# Patient Record
Sex: MALE | Race: WHITE | NOT HISPANIC OR LATINO | Employment: OTHER | URBAN - METROPOLITAN AREA
[De-identification: names, ages, dates, MRNs, and addresses within clinical notes are randomized per-mention and may not be internally consistent; named-entity substitution may affect disease eponyms.]

---

## 2023-10-31 ENCOUNTER — HOSPITAL ENCOUNTER (EMERGENCY)
Facility: HOSPITAL | Age: 88
Discharge: HOME | End: 2023-10-31
Attending: STUDENT IN AN ORGANIZED HEALTH CARE EDUCATION/TRAINING PROGRAM
Payer: MEDICARE

## 2023-10-31 VITALS
RESPIRATION RATE: 18 BRPM | OXYGEN SATURATION: 98 % | TEMPERATURE: 97.5 F | HEART RATE: 67 BPM | SYSTOLIC BLOOD PRESSURE: 137 MMHG | WEIGHT: 170 LBS | DIASTOLIC BLOOD PRESSURE: 87 MMHG | HEIGHT: 68 IN | BODY MASS INDEX: 25.76 KG/M2

## 2023-10-31 DIAGNOSIS — R35.0 URINARY FREQUENCY: Primary | ICD-10-CM

## 2023-10-31 LAB
ALBUMIN SERPL BCP-MCNC: 4.4 G/DL (ref 3.4–5)
ALP SERPL-CCNC: 90 U/L (ref 33–136)
ALT SERPL W P-5'-P-CCNC: 22 U/L (ref 10–52)
ANION GAP SERPL CALC-SCNC: 12 MMOL/L (ref 10–20)
APPEARANCE UR: CLEAR
AST SERPL W P-5'-P-CCNC: 22 U/L (ref 9–39)
BASOPHILS # BLD AUTO: 0.09 X10*3/UL (ref 0–0.1)
BASOPHILS NFR BLD AUTO: 0.8 %
BILIRUB SERPL-MCNC: 0.4 MG/DL (ref 0–1.2)
BILIRUB UR STRIP.AUTO-MCNC: NEGATIVE MG/DL
BUN SERPL-MCNC: 23 MG/DL (ref 6–23)
CALCIUM SERPL-MCNC: 9.3 MG/DL (ref 8.6–10.3)
CHLORIDE SERPL-SCNC: 103 MMOL/L (ref 98–107)
CO2 SERPL-SCNC: 27 MMOL/L (ref 21–32)
COLOR UR: NORMAL
CREAT SERPL-MCNC: 1.24 MG/DL (ref 0.5–1.3)
EOSINOPHIL # BLD AUTO: 0.18 X10*3/UL (ref 0–0.4)
EOSINOPHIL NFR BLD AUTO: 1.7 %
ERYTHROCYTE [DISTWIDTH] IN BLOOD BY AUTOMATED COUNT: 13.2 % (ref 11.5–14.5)
GFR SERPL CREATININE-BSD FRML MDRD: 53 ML/MIN/1.73M*2
GLUCOSE SERPL-MCNC: 93 MG/DL (ref 74–99)
GLUCOSE UR STRIP.AUTO-MCNC: NEGATIVE MG/DL
HCT VFR BLD AUTO: 41.8 % (ref 41–52)
HGB BLD-MCNC: 13.8 G/DL (ref 13.5–17.5)
HOLD SPECIMEN: NORMAL
HOLD SPECIMEN: NORMAL
IMM GRANULOCYTES # BLD AUTO: 0.05 X10*3/UL (ref 0–0.5)
IMM GRANULOCYTES NFR BLD AUTO: 0.5 % (ref 0–0.9)
KETONES UR STRIP.AUTO-MCNC: NEGATIVE MG/DL
LEUKOCYTE ESTERASE UR QL STRIP.AUTO: NEGATIVE
LYMPHOCYTES # BLD AUTO: 1.76 X10*3/UL (ref 0.8–3)
LYMPHOCYTES NFR BLD AUTO: 16.1 %
MCH RBC QN AUTO: 30.5 PG (ref 26–34)
MCHC RBC AUTO-ENTMCNC: 33 G/DL (ref 32–36)
MCV RBC AUTO: 93 FL (ref 80–100)
MONOCYTES # BLD AUTO: 0.88 X10*3/UL (ref 0.05–0.8)
MONOCYTES NFR BLD AUTO: 8.1 %
NEUTROPHILS # BLD AUTO: 7.94 X10*3/UL (ref 1.6–5.5)
NEUTROPHILS NFR BLD AUTO: 72.8 %
NITRITE UR QL STRIP.AUTO: NEGATIVE
NRBC BLD-RTO: 0 /100 WBCS (ref 0–0)
PH UR STRIP.AUTO: 7 [PH]
PLATELET # BLD AUTO: 154 X10*3/UL (ref 150–450)
PMV BLD AUTO: 11 FL (ref 7.5–11.5)
POTASSIUM SERPL-SCNC: 4.7 MMOL/L (ref 3.5–5.3)
PROT SERPL-MCNC: 7.7 G/DL (ref 6.4–8.2)
PROT UR STRIP.AUTO-MCNC: NEGATIVE MG/DL
RBC # BLD AUTO: 4.52 X10*6/UL (ref 4.5–5.9)
RBC # UR STRIP.AUTO: NEGATIVE /UL
SODIUM SERPL-SCNC: 137 MMOL/L (ref 136–145)
SP GR UR STRIP.AUTO: 1.01
UROBILINOGEN UR STRIP.AUTO-MCNC: <2 MG/DL
WBC # BLD AUTO: 10.9 X10*3/UL (ref 4.4–11.3)

## 2023-10-31 PROCEDURE — 51798 US URINE CAPACITY MEASURE: CPT

## 2023-10-31 PROCEDURE — 36415 COLL VENOUS BLD VENIPUNCTURE: CPT

## 2023-10-31 PROCEDURE — 99283 EMERGENCY DEPT VISIT LOW MDM: CPT | Mod: 25

## 2023-10-31 PROCEDURE — 80053 COMPREHEN METABOLIC PANEL: CPT

## 2023-10-31 PROCEDURE — 99284 EMERGENCY DEPT VISIT MOD MDM: CPT | Performed by: STUDENT IN AN ORGANIZED HEALTH CARE EDUCATION/TRAINING PROGRAM

## 2023-10-31 PROCEDURE — 85025 COMPLETE CBC W/AUTO DIFF WBC: CPT

## 2023-10-31 PROCEDURE — 81003 URINALYSIS AUTO W/O SCOPE: CPT

## 2023-10-31 ASSESSMENT — COLUMBIA-SUICIDE SEVERITY RATING SCALE - C-SSRS
1. IN THE PAST MONTH, HAVE YOU WISHED YOU WERE DEAD OR WISHED YOU COULD GO TO SLEEP AND NOT WAKE UP?: NO
2. HAVE YOU ACTUALLY HAD ANY THOUGHTS OF KILLING YOURSELF?: NO
6. HAVE YOU EVER DONE ANYTHING, STARTED TO DO ANYTHING, OR PREPARED TO DO ANYTHING TO END YOUR LIFE?: NO

## 2023-10-31 ASSESSMENT — PAIN SCALES - GENERAL: PAINLEVEL_OUTOF10: 0 - NO PAIN

## 2023-10-31 ASSESSMENT — PAIN - FUNCTIONAL ASSESSMENT: PAIN_FUNCTIONAL_ASSESSMENT: 0-10

## 2023-10-31 NOTE — ED PROVIDER NOTES
"EMERGENCY MEDICINE EVALUATION NOTE    History of Present Illness     Chief Complaint:   Chief Complaint   Patient presents with    Urinary Retention     Patient arrived from home via private auto ambulatory with a walker complaining of an \"obstructed bladder\" per his primary care today.  The patient has had multiple attempts at urinating per caregivers beginning Saturday, but has not been able to produce any significant amounts of urine.  Per daughter, the patient is normally confused, but he has been more confused since.       HPI: Collin Morataya is a 89 y.o. male with past medical history of dementia who presents with complaint of urinary retention.  Patient is here with a family at bedside.  She states that the patient does receive caregivers on a 24/7 hour basis and states over the past several days the patient has been having increased urinary frequency.  She states occasionally patient does not even produce any urine although denies any change in bowel movements.  Daughter does state the patient appears to be somewhat more confused.  Patient self is alert and oriented x3 currently.  Patient self has no complaints.  Primary care provider states that they did a urinalysis which was negative for any acute finding and was sent here due to concern for obstruction and possible need for Barton catheter.    Previous History   No past medical history on file.  Past Surgical History:   Procedure Laterality Date    CT AORTA AND BILATERAL ILIOFEMORAL RUNOFF ANGIOGRAM W AND/OR WO IV CONTRAST  3/16/2023    CT AORTA AND BILATERAL ILIOFEMORAL RUNOFF ANGIOGRAM W AND/OR WO IV CONTRAST [facility]        No family history on file.  Allergies   Allergen Reactions    Penicillins Unknown     No current outpatient medications    Physical Exam     Appearance: Alert, orientedx3 , cooperative,  in acute distress. Well nourished & well hydrated.     Skin: Intact,  dry skin, no lesions, rash, petechiae or purpura.      Eyes: PERRLA, EOMs " intact,  Conjunctiva pink with no redness or exudates. Cornea & anterior chamber are clear, Eyelids without lesions. No scleral icterus.      ENT: Hearing grossly intact. External auditory canals patent, tympanic membranes intact with visible landmarks. Nares patent, mucus membranes moist. Dentition without lesions. Pharynx clear, uvula midline.      Neck: Supple, without meningismus. Thyroid not palpable. Trachea at midline. No lymphadenopathy.     Pulmonary: Clear bilaterally with good chest wall excursion. No rales, rhonchi or wheezing. No accessory muscle use or stridor.     Cardiac: Normal S1, S2 without murmur, rub, gallop or extrasystole. No JVD, Carotids without bruits.     Abdomen: Soft, moderate suprapubic abdominal distention and tenderness, active bowel sounds.  No palpable organomegaly.  No rebound or guarding.  No CVA tenderness.     Genitourinary: Exam deferred.     Musculoskeletal: Full range of motion. no pain, edema, or deformity. Pulses full and equal. No cyanosis or clubbing.      Neurological:  Cranial nerves II through XII are grossly intact, finger-nose touch is normal, normal sensation, no weakness, no focal findings identified.     Psychiatric: Appropriate mood and affect.      Results     Labs Reviewed   CBC WITH AUTO DIFFERENTIAL - Abnormal       Result Value    WBC 10.9      nRBC 0.0      RBC 4.52      Hemoglobin 13.8      Hematocrit 41.8      MCV 93      MCH 30.5      MCHC 33.0      RDW 13.2      Platelets 154      MPV 11.0      Neutrophils % 72.8      Immature Granulocytes %, Automated 0.5      Lymphocytes % 16.1      Monocytes % 8.1      Eosinophils % 1.7      Basophils % 0.8      Neutrophils Absolute 7.94 (*)     Immature Granulocytes Absolute, Automated 0.05      Lymphocytes Absolute 1.76      Monocytes Absolute 0.88 (*)     Eosinophils Absolute 0.18      Basophils Absolute 0.09     COMPREHENSIVE METABOLIC PANEL - Abnormal    Glucose 93      Sodium 137      Potassium 4.7       "Chloride 103      Bicarbonate 27      Anion Gap 12      Urea Nitrogen 23      Creatinine 1.24      eGFR 53 (*)     Calcium 9.3      Albumin 4.4      Alkaline Phosphatase 90      Total Protein 7.7      AST 22      Bilirubin, Total 0.4      ALT 22     URINALYSIS WITH REFLEX MICROSCOPIC AND CULTURE - Normal    Color, Urine Straw      Appearance, Urine Clear      Specific Gravity, Urine 1.012      pH, Urine 7.0      Protein, Urine NEGATIVE      Glucose, Urine NEGATIVE      Blood, Urine NEGATIVE      Ketones, Urine NEGATIVE      Bilirubin, Urine NEGATIVE      Urobilinogen, Urine <2.0      Nitrite, Urine NEGATIVE      Leukocyte Esterase, Urine NEGATIVE     URINALYSIS WITH REFLEX MICROSCOPIC AND CULTURE    Narrative:     The following orders were created for panel order Urinalysis with Reflex Microscopic and Culture.  Procedure                               Abnormality         Status                     ---------                               -----------         ------                     Urinalysis with Reflex Mi...[[id number]]  Normal              Final result               Extra Urine Gray Tube[[id number]]                                                          Please view results for these tests on the individual orders.   GRAY TOP     No orders to display         ED Course & Medical Decision Making   Medications - No data to display  Diagnoses as of 10/31/23 2100   Urinary frequency     Heart Rate:  [61-67]   Temp:  [36.4 °C (97.5 °F)]   Resp:  [16-18]   BP: (137-145)/()   Height:  [172.7 cm (5' 8\")]   Weight:  [77.1 kg (170 lb)]   SpO2:  [96 %-98 %]      Collin Morataya is a 89 y.o. male with past medical history of dementia who presents with complaint of urinary retention.  Patient does have some mild distention in his suprapubic tenderness with concern for possible underlying acute urinary infection versus urinary retention from BPH.  Otherwise hemodynamically stable and afebrile.  Well-appearing.  Bladder scan " did only show 118 mL of retained urine.Lab work also reassuring with no significant renal dysfunction or electrolyte normality.  LFTs normal.  No leukocytosis or anemia.  Patient well-appearing.  He is here with the daughter and informed these findings.  He did wish to be discharged home at this time and will be given a urology referral for further evaluation of possible need for a cystoscopy for evaluation of his increased urinary frequency.  UA did not show any evidence of urinary tract infection or other acute findings.    Procedures   Procedures    Diagnosis     1. Urinary frequency        Disposition     DISCHARGE.  The patient is discharged back to their place of residence.  Discharge diagnosis, instructions and plan were discussed and understood. At the time of discharge the patient was comfortable and was in no apparent distress. Patient is aware of diagnostic uncertainty and was notified though testing is negative here, there is a very small chance that pathology may be missed.  The patient understands these risks and the patient /family understood to return immediately to the emergency department if the symptoms worsen or if they have any additional concerns.    FOLLOW UP  Primary care provider in 1-2 days.        ED Prescriptions    None            Dusty Limon DO  10/31/23 2100

## 2023-10-31 NOTE — ED TRIAGE NOTES
TRIAGE NOTE   I saw the patient as the Clinician in Triage and performed a brief history and physical exam, established acuity, and ordered appropriate tests to develop basic plan of care. Patient will be seen by an [facility], resident and/or physician who will independently evaluate the patient. Please see subsequent provider notes for further details and disposition.     Brief HPI: In brief, Collin Morataya is a 89 y.o. male that presents for urinary retention.  Patient accompanied by daughter.  Daughter states that with past 3 days patient has become more confused and increased urination.  States that he was seen by his PCP who did a urinalysis that was unremarkable and stated that he should come to the ED because he may have an obstruction.  No fever/chills, nausea/vomiting.  No dysuria.  No hematuria.  No other symptoms or concerns at this time    Focused Physical exam:   Abdomen soft nontender nondistended.  No rebound tenderness or guarding.  No palpable masses.  No pulsatile masses.  Patient redirects when I asked him where he is, how old he is, his name  Plan/MDM:   Initiating CBC, CMP, UA.  Patient will be seen in the back to the ED for further evaluation and treatment.  This is only a preliminary evaluation and I will not be following with this patient during his ED visit.  Please see subsequent provider note for further details and disposition

## 2023-11-01 NOTE — DISCHARGE INSTRUCTIONS
You have been seen at a [facility].  Please follow-up with your primary care provider in the next 1 to 2 days for further evaluation and routine follow-up.  Please return to the emergency room if having any worsening symptoms.  Please follow-up with any specialists if discussed during your emergency room stay.

## 2023-11-27 ENCOUNTER — OFFICE VISIT (OUTPATIENT)
Dept: UROLOGY | Facility: CLINIC | Age: 88
End: 2023-11-27
Payer: MEDICARE

## 2023-11-27 VITALS — HEIGHT: 68 IN | BODY MASS INDEX: 25.76 KG/M2 | WEIGHT: 170 LBS

## 2023-11-27 DIAGNOSIS — R35.0 BENIGN PROSTATIC HYPERPLASIA WITH URINARY FREQUENCY: Primary | ICD-10-CM

## 2023-11-27 DIAGNOSIS — N40.1 BENIGN PROSTATIC HYPERPLASIA WITH URINARY FREQUENCY: Primary | ICD-10-CM

## 2023-11-27 PROCEDURE — 1036F TOBACCO NON-USER: CPT | Performed by: NURSE PRACTITIONER

## 2023-11-27 PROCEDURE — 99213 OFFICE O/P EST LOW 20 MIN: CPT | Performed by: NURSE PRACTITIONER

## 2023-11-27 PROCEDURE — 51798 US URINE CAPACITY MEASURE: CPT | Performed by: NURSE PRACTITIONER

## 2023-11-27 PROCEDURE — 1126F AMNT PAIN NOTED NONE PRSNT: CPT | Performed by: NURSE PRACTITIONER

## 2023-11-27 ASSESSMENT — PATIENT HEALTH QUESTIONNAIRE - PHQ9
2. FEELING DOWN, DEPRESSED OR HOPELESS: NOT AT ALL
1. LITTLE INTEREST OR PLEASURE IN DOING THINGS: NOT AT ALL
SUM OF ALL RESPONSES TO PHQ9 QUESTIONS 1 AND 2: 0

## 2023-11-27 ASSESSMENT — ENCOUNTER SYMPTOMS
LOSS OF SENSATION IN FEET: 0
OCCASIONAL FEELINGS OF UNSTEADINESS: 0
DEPRESSION: 0

## 2023-11-27 ASSESSMENT — COLUMBIA-SUICIDE SEVERITY RATING SCALE - C-SSRS
6. HAVE YOU EVER DONE ANYTHING, STARTED TO DO ANYTHING, OR PREPARED TO DO ANYTHING TO END YOUR LIFE?: NO
2. HAVE YOU ACTUALLY HAD ANY THOUGHTS OF KILLING YOURSELF?: NO
1. IN THE PAST MONTH, HAVE YOU WISHED YOU WERE DEAD OR WISHED YOU COULD GO TO SLEEP AND NOT WAKE UP?: NO

## 2023-11-27 ASSESSMENT — PAIN SCALES - GENERAL: PAINLEVEL: 0-NO PAIN

## 2023-11-27 NOTE — PROGRESS NOTES
[facility]  Outpatient Clinic Note      Patient: Coliln Morataya  Age/Sex: 89 y.o., male  MRN: [id number]      History of Present Illness  89-year-old gentleman presents as new patient for evaluation of urinary symptoms. He is accompanied by caregiver. He has a history of dementia and HPI was obtained from patient's daughter via telephone call. He was seen in the ED last month with urinary frequency and urgency. At that time, PCP had concern for urinary retention and he was seen in ED with normal bladder scan. His daughter notes some persistent symptoms including nocturia. Reports baseline constipation. No dysuria or gross hematuria.    Past Medical & Surgical History  No past medical history on file.   Past Surgical History:   Procedure Laterality Date    CT AORTA AND BILATERAL ILIOFEMORAL RUNOFF ANGIOGRAM W AND/OR WO IV CONTRAST  3/16/2023    CT AORTA AND BILATERAL ILIOFEMORAL RUNOFF ANGIOGRAM W AND/OR WO IV CONTRAST [facility]          Labs  Component      Latest Ref Rng 10/31/2023   Color, Urine      Straw, Yellow  Straw    Appearance, Urine      Clear  Clear    Specific Gravity, Urine      1.005 - 1.035  1.012    pH, Urine      5.0, 5.5, 6.0, 6.5, 7.0, 7.5, 8.0  7.0    Protein, Urine      NEGATIVE mg/dL NEGATIVE    Glucose, Urine      NEGATIVE mg/dL NEGATIVE    Blood, Urine      NEGATIVE  NEGATIVE    Ketones, Urine      NEGATIVE mg/dL NEGATIVE    Bilirubin, Urine      NEGATIVE  NEGATIVE    Urobilinogen, Urine      <2.0 mg/dL <2.0    Nitrite, Urine      NEGATIVE  NEGATIVE    Leukocyte Esterase, Urine      NEGATIVE  NEGATIVE          Medications:  No current outpatient medications on file prior to visit.     No current facility-administered medications on file prior to visit.          Physical Exam                                                                                                                      General: Well developed, well nourished, alert and cooperative, appears in no acute  distress  Eyes: Non-injected conjunctiva, sclera clear, no proptosis  Cardiac: Extremities are warm and well perfused. No edema, cyanosis or pallor.   Lungs: Breathing is easy, non-labored. Speaking in clear and complete sentences. Normal diaphragmatic movement.  MSK: Ambulatory with steady gait, unassisted  Neuro: alert and oriented to person, place and time  Psych: Demonstrates good judgement and reason, without hallucinations, abnormal affect or abnormal behaviors.  Skin: no obvious lesions, no rashes      Review of Systems  Review of Systems   All other systems reviewed and are negative.         Imaging  NA      Assessment & Plan  89-year-old gentleman presents as new patient for evaluation of urinary symptoms. He is accompanied by caregiver. He has a history of dementia and HPI was obtained from patient's daughter via telephone call. He was seen in the ED last month with urinary frequency and urgency. At that time, PCP had concern for urinary retention and he was seen in ED with normal bladder scan. His daughter notes some persistent symptoms including nocturia. Reports baseline constipation. No dysuria or gross hematuria. PVR 0cc.    We had a long discussion regarding his symptoms. Symptoms are likely exacerbated by baseline constipation. We discussed he might have some degree of OAB, however after discussing medical therapy- his daughter is not interested.    Recommend conservative measures at this point including avoiding bladder irritants, timed voiding, managing constipation and decreasing PO fluid intake at HS.    Follow-up PRN.      Reviewed and approved by MIRIAM VAZQUEZ on 11/27/23 at 10:16 AM.

## 2023-12-24 ENCOUNTER — HOSPITAL ENCOUNTER (EMERGENCY)
Facility: HOSPITAL | Age: 88
Discharge: HOME | End: 2023-12-24
Attending: GENERAL PRACTICE
Payer: MEDICARE

## 2023-12-24 VITALS
OXYGEN SATURATION: 97 % | TEMPERATURE: 98.6 F | BODY MASS INDEX: 23.8 KG/M2 | HEIGHT: 71 IN | WEIGHT: 169.97 LBS | HEART RATE: 66 BPM | DIASTOLIC BLOOD PRESSURE: 63 MMHG | RESPIRATION RATE: 16 BRPM | SYSTOLIC BLOOD PRESSURE: 140 MMHG

## 2023-12-24 DIAGNOSIS — Z51.89 ENCOUNTER FOR WOUND CARE: Primary | ICD-10-CM

## 2023-12-24 PROCEDURE — 99283 EMERGENCY DEPT VISIT LOW MDM: CPT | Performed by: GENERAL PRACTICE

## 2023-12-24 RX ORDER — BACITRACIN ZINC 500 UNIT/G
1 OINTMENT (GRAM) TOPICAL 2 TIMES DAILY
Qty: 14 G | Refills: 0 | Status: SHIPPED | OUTPATIENT
Start: 2023-12-24 | End: 2024-01-03

## 2023-12-24 RX ORDER — CEPHALEXIN 500 MG/1
500 CAPSULE ORAL 3 TIMES DAILY
Qty: 30 CAPSULE | Refills: 0 | Status: SHIPPED | OUTPATIENT
Start: 2023-12-24 | End: 2024-01-03

## 2023-12-24 NOTE — ED PROVIDER NOTES
EMERGENCY MEDICINE EVALUATION NOTE    History of Present Illness     Chief Complaint:   Chief Complaint   Patient presents with    Elbow Pain     Patient has skin needed to be removed from elbow following fall was seen in telehealth       HPI: Collin Morataya is a 89 y.o. male presents with a chief complaint of wound.  Patient has a wound over the left elbow that was after about a week ago.  Patient was brought in by son today and patient's history is primarily obtained from son due to dementia.  Patient had a fall about a week ago and landed on left elbow causing a skin tear.  He states that they had and evaluated as an outpatient via telehealth and they were concerned about potential infection so they came in today.  Patient has not had any fevers or chills.  They state they have been keeping it covered and putting topical antibiotic ointment from the store on it.    Previous History   No past medical history on file.  Past Surgical History:   Procedure Laterality Date    CT AORTA AND BILATERAL ILIOFEMORAL RUNOFF ANGIOGRAM W AND/OR WO IV CONTRAST  3/16/2023    CT AORTA AND BILATERAL ILIOFEMORAL RUNOFF ANGIOGRAM W AND/OR WO IV CONTRAST [facility] CT     Social History     Tobacco Use    Smoking status: Never    Smokeless tobacco: Never   Substance Use Topics    Alcohol use: Not Currently    Drug use: Yes     No family history on file.  Allergies   Allergen Reactions    Penicillins Unknown     Current Outpatient Medications   Medication Instructions    bacitracin 500 unit/gram ointment 1 Application, Topical, 2 times daily    cephalexin (KEFLEX) 500 mg, oral, 3 times daily       Physical Exam     Appearance: Alert to person, cooperative,  in no acute distress.      Skin: Patient does have approximately 5 x 4 cm skin tear on the posterior aspect of the left elbow.  Dry skin, no lesions, rash     Eyes: PERRLA, EOMs intact,  Conjunctiva pink      ENT: Hearing grossly intact. Pharynx clear     Neck: Supple. Trachea at  "midline.      Pulmonary: Clear bilaterally. No rales, rhonchi or wheezing. No accessory muscle use or stridor.     Cardiac: Normal rate and rhythm without murmur     Abdomen: Soft, nontender, active bowel sounds.     Musculoskeletal: Full range of motion.      Neurological: Alert to person which is patient's baseline, no focal deficits noted.     Results   Labs Reviewed - No data to display  No orders to display         ED Course & Medical Decision Making   Medications - No data to display  Heart Rate:  [66]   Temp:  [37 °C (98.6 °F)]   Resp:  [16]   BP: (140)/(63)   Height:  [180.3 cm (5' 11\")]   Weight:  [77.1 kg (169 lb 15.6 oz)]   SpO2:  [97 %]    Diagnoses as of 12/24/23 1703   Encounter for wound care     Collin Morataya is a 89 y.o. male presents with a chief complaint of wound.  Patient has a wound over the left elbow that was after about a week ago.  Patient was brought in by son today and patient's history is primarily obtained from son due to dementia.  Patient had a fall about a week ago and landed on left elbow causing a skin tear.  He states that they had and evaluated as an outpatient via telehealth and they were concerned about potential infection so they came in today.  Patient has not had any fevers or chills.  Plan of care discussed with patient son and caregiver present.  At this time I do not think any wound revision is necessary.  Patient will have bacitracin ointment prescribed as well as some Keflex for home.  Patient and family agreement plan of care.  There is urgent follow-up with primary care provider as needed.    Procedures   Procedures    Diagnosis     1. Encounter for wound care        Disposition   Discharged    ED Prescriptions       Medication Sig Dispense Start Date End Date Auth. Provider    bacitracin 500 unit/gram ointment Apply 1 Application topically 2 times a day for 10 days. 14 g 12/24/2023 1/3/2024 Geovanni Escalona PA-C    cephalexin (Keflex) 500 mg capsule Take 1 capsule " (500 mg) by mouth 3 times a day for 10 days. 30 capsule 12/24/2023 1/3/2024 Geovanni Escalona PA-C            Disclaimer: This note was dictated by speech recognition. Minor errors in transcription may be present. Please call if questions.       Geovanni Escalona PA-C  12/24/23 1284

## 2024-02-18 PROCEDURE — 87086 URINE CULTURE/COLONY COUNT: CPT

## 2024-02-19 ENCOUNTER — LAB REQUISITION (OUTPATIENT)
Dept: LAB | Facility: HOSPITAL | Age: 89
End: 2024-02-19
Payer: MEDICARE

## 2024-02-19 DIAGNOSIS — N39.0 URINARY TRACT INFECTION, SITE NOT SPECIFIED: ICD-10-CM

## 2024-02-20 LAB — BACTERIA UR CULT: NO GROWTH
